# Patient Record
Sex: FEMALE | Race: WHITE
[De-identification: names, ages, dates, MRNs, and addresses within clinical notes are randomized per-mention and may not be internally consistent; named-entity substitution may affect disease eponyms.]

---

## 2019-09-15 ENCOUNTER — HOSPITAL ENCOUNTER (EMERGENCY)
Dept: HOSPITAL 74 - JER | Age: 74
Discharge: NURSING, REHAB OR LONG-TERM CARE | End: 2019-09-15
Payer: COMMERCIAL

## 2019-09-15 VITALS — DIASTOLIC BLOOD PRESSURE: 68 MMHG | SYSTOLIC BLOOD PRESSURE: 149 MMHG | HEART RATE: 77 BPM

## 2019-09-15 VITALS — TEMPERATURE: 97.8 F

## 2019-09-15 VITALS — BODY MASS INDEX: 31.4 KG/M2

## 2019-09-15 DIAGNOSIS — Z98.890: ICD-10-CM

## 2019-09-15 DIAGNOSIS — R42: ICD-10-CM

## 2019-09-15 DIAGNOSIS — N39.0: Primary | ICD-10-CM

## 2019-09-15 DIAGNOSIS — E78.5: ICD-10-CM

## 2019-09-15 DIAGNOSIS — I10: ICD-10-CM

## 2019-09-15 DIAGNOSIS — Z79.01: ICD-10-CM

## 2019-09-15 DIAGNOSIS — Z87.81: ICD-10-CM

## 2019-09-15 LAB
ALBUMIN SERPL-MCNC: 3.7 G/DL (ref 3.4–5)
ALBUMIN SERPL-MCNC: 3.7 G/DL (ref 3.4–5)
ALP SERPL-CCNC: 671 U/L (ref 45–117)
ALP SERPL-CCNC: 708 U/L (ref 45–117)
ALT SERPL-CCNC: 379 U/L (ref 13–61)
ALT SERPL-CCNC: 385 U/L (ref 13–61)
ANION GAP SERPL CALC-SCNC: 7 MMOL/L (ref 8–16)
ANION GAP SERPL CALC-SCNC: 9 MMOL/L (ref 8–16)
APPEARANCE UR: CLEAR
AST SERPL-CCNC: 484 U/L (ref 15–37)
AST SERPL-CCNC: 580 U/L (ref 15–37)
BACTERIA # UR AUTO: 5.6 /HPF
BASOPHILS # BLD: 0.3 % (ref 0–2)
BILIRUB SERPL-MCNC: 0.8 MG/DL (ref 0.2–1)
BILIRUB SERPL-MCNC: 0.9 MG/DL (ref 0.2–1)
BILIRUB UR STRIP.AUTO-MCNC: NEGATIVE MG/DL
BUN SERPL-MCNC: 14.2 MG/DL (ref 7–18)
BUN SERPL-MCNC: 15.2 MG/DL (ref 7–18)
CALCIUM SERPL-MCNC: 8.8 MG/DL (ref 8.5–10.1)
CALCIUM SERPL-MCNC: 8.9 MG/DL (ref 8.5–10.1)
CASTS URNS QL MICRO: 16 /LPF (ref 0–8)
CHLORIDE SERPL-SCNC: 103 MMOL/L (ref 98–107)
CHLORIDE SERPL-SCNC: 104 MMOL/L (ref 98–107)
CO2 SERPL-SCNC: 24 MMOL/L (ref 21–32)
CO2 SERPL-SCNC: 26 MMOL/L (ref 21–32)
COLOR UR: YELLOW
CREAT SERPL-MCNC: 0.9 MG/DL (ref 0.55–1.3)
CREAT SERPL-MCNC: 1.1 MG/DL (ref 0.55–1.3)
DEPRECATED RDW RBC AUTO: 14.2 % (ref 11.6–15.6)
EOSINOPHIL # BLD: 0.5 % (ref 0–4.5)
EPITH CASTS URNS QL MICRO: 12.5 /HPF
GLUCOSE SERPL-MCNC: 101 MG/DL (ref 74–106)
GLUCOSE SERPL-MCNC: 109 MG/DL (ref 74–106)
HCT VFR BLD CALC: 33.9 % (ref 32.4–45.2)
HGB BLD-MCNC: 11.3 GM/DL (ref 10.7–15.3)
KETONES UR QL STRIP: NEGATIVE
LEUKOCYTE ESTERASE UR QL STRIP.AUTO: (no result)
LIPASE SERPL-CCNC: (no result) U/L (ref 73–393)
LYMPHOCYTES # BLD: 15.8 % (ref 8–40)
MCH RBC QN AUTO: 32.9 PG (ref 25.7–33.7)
MCHC RBC AUTO-ENTMCNC: 33.2 G/DL (ref 32–36)
MCV RBC: 99 FL (ref 80–96)
MONOCYTES # BLD AUTO: 10.6 % (ref 3.8–10.2)
NEUTROPHILS # BLD: 72.8 % (ref 42.8–82.8)
NITRITE UR QL STRIP: NEGATIVE
PH UR: 5 [PH] (ref 5–8)
PLATELET # BLD AUTO: 214 K/MM3 (ref 134–434)
PMV BLD: 9.5 FL (ref 7.5–11.1)
POTASSIUM SERPLBLD-SCNC: 4.1 MMOL/L (ref 3.5–5.1)
POTASSIUM SERPLBLD-SCNC: 5 MMOL/L (ref 3.5–5.1)
PROT SERPL-MCNC: 6.3 G/DL (ref 6.4–8.2)
PROT SERPL-MCNC: 6.6 G/DL (ref 6.4–8.2)
PROT UR QL STRIP: NEGATIVE
PROT UR QL STRIP: NEGATIVE
RBC # BLD AUTO: 3.43 M/MM3 (ref 3.6–5.2)
RBC # BLD AUTO: 4 /HPF (ref 0–4)
SODIUM SERPL-SCNC: 136 MMOL/L (ref 136–145)
SODIUM SERPL-SCNC: 137 MMOL/L (ref 136–145)
SP GR UR: 1.02 (ref 1.01–1.03)
UROBILINOGEN UR STRIP-MCNC: 1 MG/DL (ref 0.2–1)
WBC # BLD AUTO: 5.7 K/MM3 (ref 4–10)
WBC # UR AUTO: 27 /HPF (ref 0–5)

## 2019-09-15 PROCEDURE — 3E033GC INTRODUCTION OF OTHER THERAPEUTIC SUBSTANCE INTO PERIPHERAL VEIN, PERCUTANEOUS APPROACH: ICD-10-PCS

## 2019-09-15 NOTE — PDOC
History of Present Illness





- General


Chief Complaint: Nausea/Vomiting


Stated Complaint: VOMITING


Time Seen by Provider: 09/15/19 16:48


History Source: Patient





- History of Present Illness


Initial Comments: 





09/15/19 17:20


72 y/o/f from [facility] rehab due to a Right femur fracture that occurred on 8/20/ 19 here for dizziness that started this afternoon. Patient states she was using 

her wheelchair to go back to her room when she started to feel dizzy. When she 

got up to get her food tray she felt dizzier and grabbed her food tray to 

steady herself. She states that she felt like the room was spinning when she 

felt dizzy. She did not fall down or lose consciousness. She was given 

Meclizine 12.5mg at [facility] with improvement to her dizziness. She vomited 

twice after taking Meclizine. There was no blood in her vomit. She no longer 

feels dizzy but still feels nauseous and "gassy." She had a similar episode of 

dizziness one week ago but it resolved on its own and she was not sent to the 

hospital at that time. She denies any diarrhea, numbness, tingling, headache, 

fever, chest pain, changes in fever, abd pain, SOB, cough, or other symptoms. 


Patient states she has not had the dressing from her ORIF surgery changed yet 

as she has not been able to make an appointment. 





PMHx: HTN, HLD


SHx: surgery for Right femur fracture that occurred on 8/20, [facility] surgery. 

Cholecystectomy - 2004 


Social: denies alcohol and tobacco 














Past History





- Past Medical History


Allergies/Adverse Reactions: 


 Allergies











Allergy/AdvReac Type Severity Reaction Status Date / Time


 


amoxicillin Allergy Severe Difficulty Verified 09/15/19 16:36





   Breathing  


 


cinnamon Allergy Severe Difficulty Verified 09/15/19 16:36





   Breathing  


 


codeine Allergy Severe Difficulty Verified 09/15/19 16:36





   Breathing  


 


Penicillins Allergy Severe Difficulty Verified 09/15/19 16:36





   Breathing  


 


raspberry Allergy Mild Hives Verified 09/15/19 16:36











Home Medications: 


Ambulatory Orders





Aa/Hydrolyzed Collagen, Whey [Lps Neutral Flavor Liquid] 30 ml PO DAILY 09/15/

19 


Acetaminophen [Tylenol] 975 mg PO Q8H PRN 09/15/19 


Ascorbic Acid [Vitamin C -] 500 mg PO DAILY 09/15/19 


Atenolol [Tenormin -] 50 mg PO BID 09/15/19 


Brimonidine Tartrate [Alphagan 0.15% -] 1 drop OU DAILY 09/15/19 


Clopidogrel Bisulfate [Plavix] 75 mg PO DAILY 09/15/19 


Docusate Sodium [Colace] 100 mg PO TID 09/15/19 


Enoxaparin [Lovenox -] 40 mg SQ DAILY 09/15/19 


HYDROmorphone [Dilaudid -] 2 mg PO Q4H PRN 09/15/19 


Lisinopril 5 mg PO DAILY 09/15/19 


Meclizine HCl 12.5 mg PO Q8H PRN 09/15/19 


Nutritional Supplement [Hi-Bob] 4 oz PO DAILY 09/15/19 


Polyethylene Glycol 3350 17 gm PO DAILY 09/15/19 


Simvastatin [Zocor -] 40 mg PO HS 09/15/19 


Sulfamethoxazole/Trimethoprim [Bactrim Ds Tablet] 1 each PO BID #10 tablet MDD 

1 tab 09/15/19 








Anemia: No


Asthma: Yes


Cancer: No


Cardiac Disorders: Yes (MVP)


CVA: No


COPD: No


CHF: No


Dementia: No


Diabetes: No


GI Disorders: No


 Disorders: No


HTN: Yes


Hypercholesterolemia: Yes


Liver Disease: No


Seizures: No


Thyroid Disease: No





- Surgical History


Cholecystectomy: Yes


Orthopedic Surgery: Yes (LEFT HIP FRACTURE-PINNING)





- Suicide/Smoking/Psychosocial Hx


Smoking History: Never smoked


Have you smoked in the past 12 months: No


Hx Alcohol Use: No


Drug/Substance Use Hx: No


Substance Use Type: None


Hx Substance Use Treatment: No





**Review of Systems





- Review of Systems


Constitutional: No: Chills, Fever


HEENTM: No: Recent change in vision


Respiratory: No: Cough, Shortness of Breath


Cardiac (ROS): No: Chest Pain, Lightheadedness


ABD/GI: Yes: Nausea, Vomiting.  No: Diarrhea


: No: Dysuria, Hematuria


Musculoskeletal: Yes: Joint Pain (right hip)


Integumentary: No: Rash


Neurological: Yes: Dizziness.  No: Headache, Numbness


Endocrine: No: Excessive Sweating





*Physical Exam





- Vital Signs


 Last Vital Signs











Temp Pulse Resp BP Pulse Ox


 


 97.8 F   75   18   116/93   100 


 


 09/15/19 16:33  09/15/19 16:33  09/15/19 16:33  09/15/19 16:33  09/15/19 16:33














- Physical Exam


General Appearance: Yes: Nourished, Appropriately Dressed


HEENT: positive: EOMI, Normal Voice, Symmetrical.  negative: Pharyngeal Erythema


Neck: positive: Trachea midline, Supple


Respiratory/Chest: positive: Lungs Clear, Normal Breath Sounds.  negative: 

Accessory Muscle Use


Cardiovascular: positive: Regular Rhythm, Regular Rate, S1, S2


Gastrointestinal/Abdominal: positive: Normal Bowel Sounds, Soft.  negative: 

Tender


Musculoskeletal: negative: CVA Tenderness


Extremity: positive: Normal Capillary Refill.  negative: Swelling


Integumentary: positive: Normal Color, Other (ORIF dressing in place over right 

lower extremity with no cellulitic changes)


Neurologic: positive: CNs II-XII NML intact, Fully Oriented, Alert, Motor 

Strength 5/5, Finger to Nose, Other (reproducible dizziness with positional 

movement of head)





ED Treatment Course





- LABORATORY


CBC & Chemistry Diagram: 


 09/15/19 17:20





 09/15/19 18:41





- RADIOLOGY


Radiology Studies Ordered: 














 Category Date Time Status


 


 HEAD CT WITHOUT CONTRAST [CT] Stat CT Scan  09/15/19 17:14 Ordered














Medical Decision Making





- Medical Decision Making





09/15/19 17:38


-72 y/o/f from [facility] rehab due to a Right femur fracture that occurred on 8/20 /19 here for dizziness that started this afternoon. Patient states she was 

using her wheelchair to go back to her room when she started to feel dizzy. 

When she got up to get her food tray she felt dizzier and grabbed her food tray 

to steady herself. She states that she felt like the room was spinning when she 

felt dizzy. She did not fall down or lose consciousness. She was given 

Meclizine 12.5mg at [facility] with improvement to her dizziness. Patient vomited 

twice, no blood. Had a similar episode of dizziness one week ago. 


-Patient is on Enoxaparin and Lovenox due to recent ORIF surgery. 


-Dizziness is reproducible on physical exam with positional movement of the 

patient's head. 


-Workup with CBC, CMP, UA, Cardiac profile, EKG, CT head scan. 


-EKG reviewed, normal sinus rhythm with no acute changes. 





09/15/19 18:03


-CBC grossly normal. 


-UA positive for 2+ Leuk Esterase


-CMP pending 


09/15/19 18:11


-Will treat patient with Bactrim IV here. 





09/15/19 19:06


-CMP showed elevated LFTs. Compared to labwork [facility] 9[email or web address] 

shows that patients alk phos was 347 and LFTs were normal. 


-Will repeat CMP to confirm if LFTs are elevated or there was a possible lab 

error. 


-Will get CT abd&pelvis if LFTs are still elevated. 


-Patient had a cholecystectomy in 2004. 





09/15/19 19:52


-add on lipase was also elevated. 


-ordered CT abd&pelvic with contrast ordered to rule out GI pathology 





09/15/19 20:31


-Repeat CMP still shows elevated LFTs, alk phos, and Lipase. 


-CT head read, negative for intracranial pathology. 





09/15/19 21:11


-CT abd&pelvis negative for any acute pathology. 


-Patient is asymptomatic, not complaining of any symptoms. 


-Explained results of imaging and labs to patient. Will give patient a copy of 

labs and imaging. 


-Will discharge patient home with instructions to follow up with PCP regarding 

lab results. 





*DC/Admit/Observation/Transfer


Diagnosis at time of Disposition: 


 Vertigo





UTI (urinary tract infection)


Qualifiers:


 Urinary tract infection type: site unspecified Hematuria presence: without 

hematuria Qualified Code(s): N39.0 - Urinary tract infection, site not specified








- Discharge Dispostion


Disposition: HOME


Condition at time of disposition: Stable





- Prescriptions


Prescriptions: 


Sulfamethoxazole/Trimethoprim [Bactrim Ds Tablet] 1 each PO BID #10 tablet MDD 

1 tab





- Referrals





- Patient Instructions


Printed Discharge Instructions:  DI for Vertigo, DI for Urinary Tract Infection 

(UTI)


Additional Instructions: 


If you have worsening dizziness, abdominal pain, nausea, vomiting, fever, or 

other concerns return to the ED. 


A CT scan of your abdomen and pelvis was performed today due to elevated liver 

enzymes, however no masses, lesions, or evidence of pancreatitis was found. 


Your liver enzymes were elevated on labwork today, please follow up with your 

primary care doctor in the next few days to discuss further workup. 


You were diagnosed with a UTI here, please  your prescription and take 

as directed. 








- Post Discharge Activity

## 2019-09-15 NOTE — PDOC
Attending Attestation





- Resident


Resident Name: Bill Wattersniravpaul S





- ED Attending Attestation


I have performed the following: I have examined & evaluated the patient, The 

case was reviewed & discussed with the resident, I agree w/resident's findings 

& plan, Exceptions are as noted





- HPI


HPI: 





09/15/19 16:58


this 72 yo female BIBA p/w nausea and vomiting


09/15/19 17:05








- Physicial Exam


PE: 





09/15/19 17:36


alert,conversant 72 yo female p/w positional dizziness and  vomiting


head ncat 


neck supple


lungs cta b/l


cvs  [id number]


abd no rebound,no tenderness


extremities  there is a bandage on the lateral surface of her rt femur,no 

cellulitis,no drainage,no purulence ,sensation intact


neuro axox3


09/15/19 18:10








- Medical Decision Making





09/15/19 17:08


alert,conversant 72 yo female reports vertigo assoc with 2 episodes of vomiting 

after wheeling herself into her room at [facility] where she is undergoing rehab 

for a fracture of her   rt femur that occurred August 20th


09/15/19 17:12


PMh  htn, hyperlipdemia


psh   rt hip orif


09/15/19 17:19


ekg  nsr @ 64 bpm,norrmal QTc ,inverted t waves





ROS :  this pt has no headache,no chest pain ,no dyspnea,no visual disturbances,

no limb ataxia,no neck pain


09/15/19 18:38


his labs reveal significant elevation and her liver function tests.  We have 

prior labs from September 11 this week that shows normal liver function tests.


Patient states that she did have her gallbladder removed sometime in the past.


She does not have any epigastric pain.


We will do imaging studies to take a closer look at her biliary system and 

pancreas


09/15/19 21:55








CT SCAN of abdomen and pelvis with contrast did not show any acute pancreatitis 

or pancreatic lesion or acute abdominal


 emergency 


pt is Asymptomatic and has no abdominal pain or nausea or vomiting at this 

time.Her vertigo resolved.


Patient will be discharged back to the nursing home with a copy of her lab work 

and a copy of the dictated ct scan of abd/pel


09/15/19 21:56

## 2019-09-16 NOTE — EKG
Test Reason : 

Blood Pressure : ***/*** mmHG

Vent. Rate : 064 BPM     Atrial Rate : 064 BPM

   P-R Int : 170 ms          QRS Dur : 078 ms

    QT Int : 446 ms       P-R-T Axes : 048 -09 006 degrees

   QTc Int : 460 ms

 

NORMAL SINUS RHYTHM

MODERATE VOLTAGE CRITERIA FOR LVH, MAY BE NORMAL VARIANT

BORDERLINE ECG

WHEN COMPARED WITH ECG OF 21-SEP-2016 09:53,

NO SIGNIFICANT CHANGE WAS FOUND

Confirmed by DIONNE CHRISTIANSON, NEVIN ([id number]) on 9/16/2019 10:14:38 AM

 

Referred By:             Confirmed By:NEVIN TRUONG MD